# Patient Record
Sex: FEMALE | Race: WHITE | NOT HISPANIC OR LATINO | Employment: OTHER | URBAN - METROPOLITAN AREA
[De-identification: names, ages, dates, MRNs, and addresses within clinical notes are randomized per-mention and may not be internally consistent; named-entity substitution may affect disease eponyms.]

---

## 2021-03-03 ENCOUNTER — TRANSFERRED RECORDS (OUTPATIENT)
Dept: HEALTH INFORMATION MANAGEMENT | Facility: CLINIC | Age: 71
End: 2021-03-03

## 2021-03-11 ENCOUNTER — OFFICE VISIT (OUTPATIENT)
Dept: PLASTIC SURGERY | Facility: CLINIC | Age: 71
End: 2021-03-11
Payer: MEDICARE

## 2021-03-11 DIAGNOSIS — D03.39 LENTIGO MALIGNA (MELANOMA IN SITU) OF CHEEK (H): Primary | ICD-10-CM

## 2021-03-11 NOTE — LETTER
3/11/2021       RE: Kaitlin Molina  [address] [address]     Dear Colleague,    Thank you for referring your patient, Kaitlin Molina, to the [facility]. Please see a copy of my visit note below.    This office note has been dictated.       Again, thank you for allowing me to participate in the care of your patient.      Sincerely,    RANDI ROBERTO MD

## 2021-03-11 NOTE — NURSING NOTE
Educated patient on wound care: cleanse with hydrogen peroxide mixed with water 50/50 on q tips roll over wound bed gently.  Apply Aqauphor/Bacitracin twice daily.  Cover with Telfa dressing.  Instructed patient to get her pre op scheduled for tomorrow 3-12 so that we can be ready to get her scheduled for her surgery early next week.  Pre op teaching completed.    Her son will accompany her to surgery and stay with her for 24 hours.    Anton Melton RN  3/11/2021 2:36 PM

## 2021-03-11 NOTE — LETTER
March 11, 2021  Re: Kaitlin Molina  1950    Dear Dr. Her,    Thank you so much for referring Kaitlin Molina to the [facility]. I had the pleasure of visiting with Kaitlin today.     Attached you will find a copy of my note. Please feel free to reach out to me with any questions, [phone].     Service Date: 03/11/2021      HISTORY OF PRESENT ILLNESS:  Kaitlin is in with her son today.  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  It took more than a week to get clear surgical margins with permanent section.        SOCIAL HISTORY:  She is 70-years-old.  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        ALLERGIES:  She is allergic to ACE inhibitors in that they create a cough.       MEDICATIONS:    1) Synthroid.   2) Potassium replacement.   3) Dyazide for water retention.    4) Verapamil for hypertension.    5) Melatonin to help with sleep.       PAST SURGICAL HISTORY:  She had a rotator cuff repair in 2017.       PHYSICAL EXAMINATION:  She has Jenkins I skin.  She has significant rhytids and sun damage.  She has graying hair with a full head of hair.  There is no head and neck adenopathy. Cranial nerves II-XII are intact.  She has adequate oral opening.  Her lid position is good.  There is a partially closed wound of the left cheek with a defect of approximately 4 x 5 cm.  There is some erythema medially.  She has not changed the bandage since the last session on Monday or Tuesday.  Bandages were changed.       RECOMMENDATIONS/PLAN:   Home cares were discussed and how to care for the wound.  We visibly showed her how to do this.  I am going to want to carry out a reconstruction.  My plan at this point is a cervicofacial rotation flap.  We talked about the risks and benefits, including eyelid malposition, scarring, flap loss etc.  We will look for a  time to do this.  She will need to get in for a pre-operative physical examination. She did well with the anesthetic for the rotation cuff.  She would prefer general anesthetic.        Forty minutes were spent in consultation, the majority with counseling and education as well as her examination and reviewing her pathology report.        Randi Caruso M.D.           2021      Eduardo Her M.D.    [facility]    [address] [place] [address]  [phone]      Dear Dr. Her,      I saw Kaitlin Molina today.  We talked about getting her set up for reconstruction of her cheek.  We will look to do that in the near future.  I will keep you apprised of her outcome.  I appreciate your confidence in sending her to us.       Best Regards,      RANDI CARUSO MD       D: 2021   T: 2021   MT: ms      Name:     KAITLIN MOLINA   MRN:      [id number]        Account:      [id number]   :      1950           Service Date: 2021      Document: [id number]

## 2021-03-12 ENCOUNTER — DOCUMENTATION ONLY (OUTPATIENT)
Dept: OTOLARYNGOLOGY | Facility: CLINIC | Age: 71
End: 2021-03-12

## 2021-03-12 ENCOUNTER — TRANSFERRED RECORDS (OUTPATIENT)
Dept: HEALTH INFORMATION MANAGEMENT | Facility: CLINIC | Age: 71
End: 2021-03-12

## 2021-03-12 NOTE — PROGRESS NOTES
Surgery orders faxed to [facility].    Date: Monday, March 15, 2021  Time: 1pm  Procedure: Left Cervicofacial Rotational Flap for Left Cheek MOHS Defect  Surgeon: Reuben Schroeder    Pt had pre-op physical and COVID testing performed today.  We are unclear on whether or not pt had rapid COVID test vs PCR test performed.  Call placed to pt's primary clinic to determine whether or not COVID test was rapid.      In the event the test was rapid, [facility] will coordinate another COVID PCR test to be performed today.    Sofie Lyons RN  3/12/2021 12:26 PM

## 2021-03-14 NOTE — PROGRESS NOTES
Service Date: 03/11/2021      HISTORY OF PRESENT ILLNESS:  Kaitlin is in with her son today.  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  It took more than a week to get clear surgical margins with permanent section.        SOCIAL HISTORY:  She is 70-years-old.  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        ALLERGIES:  She is allergic to ACE inhibitors in that they create a cough.       MEDICATIONS:    1) Synthroid.   2) Potassium replacement.   3) Dyazide for water retention.    4) Verapamil for hypertension.    5) Melatonin to help with sleep.       PAST SURGICAL HISTORY:  She had a rotator cuff repair in 2017.       PHYSICAL EXAMINATION:  She has Jenkins I skin.  She has significant rhytids and sun damage.  She has graying hair with a full head of hair.  There is no head and neck adenopathy. Cranial nerves II-XII are intact.  She has adequate oral opening.  Her lid position is good.  There is a partially closed wound of the left cheek with a defect of approximately 4 x 5 cm.  There is some erythema medially.  She has not changed the bandage since the last session on Monday or Tuesday.  Bandages were changed.       RECOMMENDATIONS/PLAN:   Home cares were discussed and how to care for the wound.  We visibly showed her how to do this.  I am going to want to carry out a reconstruction.  My plan at this point is a cervicofacial rotation flap.  We talked about the risks and benefits, including eyelid malposition, scarring, flap loss etc.  We will look for a time to do this.  She will need to get in for a pre-operative physical examination. She did well with the anesthetic for the rotation cuff.  She would prefer general anesthetic.        Forty minutes were spent in consultation, the majority with counseling and education as well as her examination and reviewing her  pathology report.        Randi Caruso M.D.                 2021         Eduardo Her M.D.    [facility]    [address] [place] [address]  [phone]         Dear Dr. Her,      I saw Kaitlin Molina today.  We talked about getting her set up for reconstruction of her cheek.  We will look to do that in the near future.  I will keep you apprised of her outcome.  I appreciate your confidence in sending her to us.       Best Regards,         RANDI CARUSO MD             D: 2021   T: 2021   MT: ms      Name:     KAITLIN MOLINA   MRN:      [id number]        Account:      [id number]   :      1950           Service Date: 2021      Document: [id number]

## 2021-03-15 ENCOUNTER — TRANSFERRED RECORDS (OUTPATIENT)
Dept: HEALTH INFORMATION MANAGEMENT | Facility: CLINIC | Age: 71
End: 2021-03-15

## 2021-03-22 ENCOUNTER — OFFICE VISIT (OUTPATIENT)
Dept: PLASTIC SURGERY | Facility: CLINIC | Age: 71
End: 2021-03-22
Payer: MEDICARE

## 2021-03-22 DIAGNOSIS — Z98.890 POSTOPERATIVE STATE: Primary | ICD-10-CM

## 2021-03-22 NOTE — LETTER
3/22/2021       RE: Kaitlin Molina  [address] [address]     Dear Colleague,    Thank you for referring your patient, Kaitlin Molina, to the [facility] at [facility]. Please see a copy of my visit note below.    Service Date: 2021      REASON FOR VISIT: Kaitlin is in today.       PHYSICAL EXAMINATION:  The flap looks wonderfully viable.  All of the permanent sutures are removed.        ASSESSMENT AND PLAN:  I will have her check in with us again in a month or so and talk about resecting or revising the standing cutaneous cone down the road if it is problematic for her.       Randi Caruso M.D.          2021      Eduardo Her M.D.    [facility]    [address] [place] [address]  [phone]      Dear Dr. Her,     I saw Kaitlin Molina back today.  We reconstructed the defect with a large cervicofacial rotation flap.  It is healing very nicely.  Her facial nerve is intact.  There is no evidence of infection.  As always with these big flaps, there may be a standing cutaneous cone that needs to be dealt with down the road.  All in all, we are very happy with her progress and she is pleased as well.        Thanks for letting us help in her care.       Best Regards,      RANDI CARUSO MD       D: 2021   T: 2021   MT: ms      Name:     KAITLIN MOLINA   MRN:      [id number]        Account:      [id number]   :      1950           Service Date: 2021      Document: [id number]

## 2021-03-22 NOTE — LETTER
March 22, 2021  Re: Kaitlin WREN Jesse  1950    Dear Dr. Her,    Thank you so much for referring Kaitlin Molina to the [facility]. I had the pleasure of visiting with Kaitlin today.     Attached you will find a copy of my note. Please feel free to reach out to me with any questions, [phone].     This office note has been dictated.         Your trust in our practice and care is much appreciated.    Sincerely,  RANDI ROBERTO MD

## 2021-03-23 NOTE — PROGRESS NOTES
Service Date: 2021      REASON FOR VISIT: Kaitlin is in today.       PHYSICAL EXAMINATION:  The flap looks wonderfully viable.  All of the permanent sutures are removed.        ASSESSMENT AND PLAN:  I will have her check in with us again in a month or so and talk about resecting or revising the standing cutaneous cone down the road if it is problematic for her.       Randi Caruso M.D.                2021         Eduardo Her M.D.    [facility]    [address] [place] [address]  [phone]         Dear Dr. Her,      I saw Kaitlin Molina back today.  We reconstructed the defect with a large cervicofacial rotation flap.  It is healing very nicely.  Her facial nerve is intact.  There is no evidence of infection.  As always with these big flaps, there may be a standing cutaneous cone that needs to be dealt with down the road.  All in all, we are very happy with her progress and she is pleased as well.        Thanks for letting us help in her care.       Best Regards,         RANDI CARUSO MD             D: 2021   T: 2021   MT: ms      Name:     KAITLIN MOLINA   MRN:      [id number]        Account:      [id number]   :      1950           Service Date: 2021      Document: [id number]

## 2021-04-26 ENCOUNTER — OFFICE VISIT (OUTPATIENT)
Dept: PLASTIC SURGERY | Facility: CLINIC | Age: 71
End: 2021-04-26
Payer: MEDICARE

## 2021-04-26 DIAGNOSIS — Z98.890 POSTOPERATIVE STATE: Primary | ICD-10-CM

## 2021-04-26 NOTE — LETTER
April 26, 2021  Re: Kaitlin WREN Jesse  1950    Dear Dr. Her,    Thank you so much for referring Kaitlin Molina to the [facility]. I had the pleasure of visiting with Kaitlin today.     Attached you will find a copy of my note. Please feel free to reach out to me with any questions, [phone].     This office note has been dictated.         Your trust in our practice and care is much appreciated.    Sincerely,  RANDI ROBERTO MD

## 2021-04-26 NOTE — LETTER
2021       RE: Kaitlin Molina  [address] [address]     Dear Colleague,    Thank you for referring your patient, Kaitlin Molina, to the [facility] at [facility]. Please see a copy of my visit note below.    Service Date: 2021    REASON FOR VISIT: Ms. Molina is back today.      PHYSICAL EXAMINATION: She has some edema and some pin cushioning and a little bit of inferolateral tug on the lid.  She has some occasional epiphora but she does not have a true ectropion.  The flap is neetu and is quite firm.  I trimmed out some Monocryl sutures.     PROCEDURE:  I injected 0.2 of Kenalog in the upper half of the flap in the indurated areas.      ASSESSMENT AND PLAN:  I will have her start massaging it.   She will be back to see me in six weeks.      Osito Caruso MD    D: 2021   T: 2021   MT: ms    Name:     KAITLIN MOLINA  MRN:      [id number]        Account:      [id number]   :      1950           Service Date: 2021       Document: [id number]

## 2021-04-27 NOTE — PROGRESS NOTES
Service Date: 2021    REASON FOR VISIT: Ms. Molina is back today.      PHYSICAL EXAMINATION: She has some edema and some pin cushioning and a little bit of inferolateral tug on the lid.  She has some occasional epiphora but she does not have a true ectropion.  The flap is neetu and is quite firm.  I trimmed out some Monocryl sutures.     PROCEDURE:  I injected 0.2 of Kenalog in the upper half of the flap in the indurated areas.      ASSESSMENT AND PLAN:  I will have her start massaging it.   She will be back to see me in six weeks.      Osito Caruso MD        D: 2021   T: 2021   MT: ms    Name:     JAMAR MOLINA  MRN:      [id number]        Account:      [id number]   :      1950           Service Date: 2021       Document: [id number]

## 2021-06-14 ENCOUNTER — OFFICE VISIT (OUTPATIENT)
Dept: PLASTIC SURGERY | Facility: CLINIC | Age: 71
End: 2021-06-14
Payer: MEDICARE

## 2021-06-14 DIAGNOSIS — Z98.890 POSTOPERATIVE STATE: Primary | ICD-10-CM

## 2021-06-14 NOTE — LETTER
June 14, 2021  Re: Kaitlin WREN Jesse  1950    Dear Dr. Her,    Thank you so much for referring Kaitlin Molina to the [facility]. I had the pleasure of visiting with Kaitlin today.     Attached you will find a copy of my note. Please feel free to reach out to me with any questions, [phone].     This office note has been dictated.         Your trust in our practice and care is much appreciated.    Sincerely,  RANDI ROBERTO MD

## 2021-06-14 NOTE — LETTER
6/14/2021       RE: Kaitlin Molina  [address] [address]     Dear Colleague,    Thank you for referring your patient, Kaitlin Molina, to the [facility] at [facility]. Please see a copy of my visit note below.    This office note has been dictated.       Again, thank you for allowing me to participate in the care of your patient.      Sincerely,    RANDI ROBERTO MD

## 2021-06-15 NOTE — PROGRESS NOTES
Service Date: 2021    REASON FOR VISIT: Kaitlin is in after repair of her cheek defect.      PHYSICAL EXAMINATION: It looks considerably better.  The scar is softening.   There is less downward traction.  The area injected with the steroid is markedly better.     PROCEDURE: I injected another 0.25 of 10 mg/cc Kenalog into the anterior and oblique scar.      RECOMMENDATIONS/PLAN: I will have her see me again in a couple of months.  Overall, I think things are proceeding well.      Osito Caruso MD        D: 06/15/2021   T: 06/15/2021   MT: ms    Name:     KAITLIN CABALLERO  MRN:      [id number]        Account:      [id number]   :      1950           Service Date: 2021       Document: [id number]

## 2021-08-12 ENCOUNTER — OFFICE VISIT (OUTPATIENT)
Dept: PLASTIC SURGERY | Facility: CLINIC | Age: 71
End: 2021-08-12
Payer: MEDICARE

## 2021-08-12 DIAGNOSIS — Z98.890 POSTOPERATIVE STATE: Primary | ICD-10-CM

## 2021-08-12 NOTE — LETTER
8/12/2021       RE: Kaitlin Molina  [address] [address]     Dear Colleague,    Thank you for referring your patient, Kaitlin Molina, to the [facility] at [facility]. Please see a copy of my visit note below.    This office note has been dictated.       Again, thank you for allowing me to participate in the care of your patient.      Sincerely,    RANDI ROBERTO MD

## 2021-08-12 NOTE — LETTER
8/12/2021       RE: Kaitlin Molina  [address] [address]     Dear Colleague,    Thank you for referring your patient, Kaitlin Molina, to the [facility] at [facility]. Please see a copy of my visit note below.    This office note has been dictated.     Service Date: 08/12/2021    REASON FOR VISIT: Kaitlin is back today.      PHYSICAL EXAMINATION:  The hypertrophy is less.  The traction on the lower lids is less.  Her lids are in good position.    PROCEDURE: I injected an additional 0.12 of 10 mg/cc Kenalog into the areas of edema of the flap over the malar area and where the stem and cutaneous cone was resected.  I think the pivot area and standing cutaneous cone is flattening out adequately on its own.  She still has some dysesthesias which is expected.    ASSESSMENT AND PLAN: I will have her back to see me in about three months.      Osito Caruso MD

## 2021-08-12 NOTE — LETTER
August 12, 2021  Re: Kaitlin WREN Jesse  1950    Dear Dr. Her,    Thank you so much for referring Kaitlin Molina to the [facility]. I had the pleasure of visiting with Kaitlin today.     Attached you will find a copy of my note. Please feel free to reach out to me with any questions, [phone].     This office note has been dictated.         Your trust in our practice and care is much appreciated.    Sincerely,  RANDI RBOERTO MD

## 2021-08-15 NOTE — PROGRESS NOTES
Service Date: 2021    REASON FOR VISIT: Kaitlin is back today.      PHYSICAL EXAMINATION:  The hypertrophy is less.  The traction on the lower lids is less.  Her lids are in good position.    PROCEDURE: I injected an additional 0.12 of 10 mg/cc Kenalog into the areas of edema of the flap over the malar area and where the stem and cutaneous cone was resected.  I think the pivot area and standing cutaneous cone is flattening out adequately on its own.  She still has some dysesthesias which is expected.    ASSESSMENT AND PLAN: I will have her back to see me in about three months.      Osito Caruso MD        D: 08/15/2021   T: 08/15/2021   MT: ms    Name:     KAITLIN CABALLERO  MRN:      [id number]        Account:      [id number]   :      1950           Service Date: 2021       Document: [id number]

## 2021-11-11 ENCOUNTER — OFFICE VISIT (OUTPATIENT)
Dept: PLASTIC SURGERY | Facility: CLINIC | Age: 71
End: 2021-11-11
Payer: MEDICARE

## 2021-11-11 DIAGNOSIS — Z98.890 POSTOPERATIVE STATE: Primary | ICD-10-CM

## 2021-11-11 NOTE — LETTER
2021      RE: Kaitlin Molina  [address] [address]     Service Date: 2021    REASON FOR VISIT: Kaitlin is in today.      PHYSICAL EXAMINATION: The scar is still somewhat hypertrophic but her lid is in much, much better position and it causes very few symptoms for her.  The scar is still somewhat indurated.      PROCEDURE: I injected an additional 0.25 cc of 10 mg/cc Kenalog.      ASSESSMENT AND PLAN: I will see her in about three months.      Osito Caruso MD        D: 2021   T: 2021   MT: ms    Name:     KAITLIN MOLINA  MRN:      [id number]        Account:      [id number]   :      1950           Service Date: 2021       Document: [id number]

## 2021-11-11 NOTE — LETTER
November 11, 2021  Re: Kaitlin WREN Jesse  1950    Dear Dr. Her,    Thank you so much for referring Kaitlin Molina to the [facility]. I had the pleasure of visiting with Kaitlin today.     Attached you will find a copy of my note. Please feel free to reach out to me with any questions, [phone].     This office note has been dictated.         Your trust in our practice and care is much appreciated.    Sincerely,  RANDI ROBERTO MD

## 2021-11-11 NOTE — LETTER
2021      RE: Kaitlin Molina  [address] [address]       Service Date: 2021    REASON FOR VISIT: Kaitlin is in today.      PHYSICAL EXAMINATION: The scar is still somewhat hypertrophic but her lid is in much, much better position and it causes very few symptoms for her.  The scar is still somewhat indurated.      PROCEDURE: I injected an additional 0.25 cc of 10 mg/cc Kenalog.      ASSESSMENT AND PLAN: I will see her in about three months.      Osito Caruso MD        D: 2021   T: 2021   MT: ms    Name:     KAITLIN MOLINA  MRN:      [id number]        Account:      [id number]   :      1950           Service Date: 2021       Document: [id number]

## 2021-11-11 NOTE — LETTER
11/11/2021       RE: Kaitlin Molina  [address] [address]     Dear Colleague,    Thank you for referring your patient, Kaitlin Molina, to the [facility] at [facility]. Please see a copy of my visit note below.    This office note has been dictated.       Again, thank you for allowing me to participate in the care of your patient.      Sincerely,    RANDI ROBERTO MD

## 2021-11-13 NOTE — PROGRESS NOTES
Service Date: 2021    REASON FOR VISIT: Kaitlin is in today.      PHYSICAL EXAMINATION: The scar is still somewhat hypertrophic but her lid is in much, much better position and it causes very few symptoms for her.  The scar is still somewhat indurated.      PROCEDURE: I injected an additional 0.25 cc of 10 mg/cc Kenalog.      ASSESSMENT AND PLAN: I will see her in about three months.      Osito Caruso MD        D: 2021   T: 2021   MT: ms    Name:     KAITLIN CABALLERO  MRN:      [id number]        Account:      [id number]   :      1950           Service Date: 2021       Document: [id number]

## 2022-02-10 ENCOUNTER — OFFICE VISIT (OUTPATIENT)
Dept: PLASTIC SURGERY | Facility: CLINIC | Age: 72
End: 2022-02-10
Payer: MEDICARE

## 2022-02-10 DIAGNOSIS — Z98.890 POSTOPERATIVE STATE: Primary | ICD-10-CM

## 2022-02-10 NOTE — LETTER
2/10/2022      RE: Kaitlin Molina  [address] [facility] [address]           Service Date: 02/10/2022    HISTORY OF PRESENT ILLNESS: Kaitlin is in today.      PHYSICAL EXAMINATION: She really likes the contour of the lower lid on the left.  There is still a bit of a standing cutaneous cone but that does not bother her.      ASSESSMENT AND PLAN: We will take photos today.  I anticipate the cheek will soften more with time.  She will continue to see Dr. Carroll at [facility] for ongoing management and surveillance of her skin.      Osito Caruso M.D.            February 10, 2022      Eduardo Her M.D.   [facility]  [address] [address]    [facility]oy Carroll [facility]. [facility]  [facility]  [address] [id number]      Dear Doctors,    I had the pleasure of seeing Kaitlin back after her surgery today.  We used a cervicofacial rotation flap.  Her lid has maintained good position.  The incision lines have settled in nicely.  She does have a bit of a standing cutaneous cone lower in the cheek which I had left in order to preserve a broad enough vascular pedicle to the flap.  I talked with her about revising that, but it is not problematic enough that she wants to do anything.  I think that is a reasonable approach.  She will follow up with us as needed.      Best Regards,       Osito Caruso MD        D: 2022   T: 2022   MT: ms    Name:     KAITLIN MOLINA  MRN:      [id number]        Account:      [id number]   :      1950           Service Date: 02/10/2022       Document: [id number]

## 2022-02-10 NOTE — LETTER
2/10/2022       RE: Kaitlin Molina  [address] [address]     Dear Colleague,    Thank you for referring your patient, Kaitlin Molina, to the [facility] at [facility]. Please see a copy of my visit note below.    This office note has been dictated.       Again, thank you for allowing me to participate in the care of your patient.      Sincerely,    RANDI ROBERTO MD

## 2022-02-10 NOTE — LETTER
February 10, 2022  Re: Kaitlin WREN Jesse  1950    Dear Dr. Her,    Thank you so much for referring Kaitlin Molina to the [facility]. I had the pleasure of visiting with Kaitlin today.     Attached you will find a copy of my note. Please feel free to reach out to me with any questions, [phone].     This office note has been dictated.         Your trust in our practice and care is much appreciated.    Sincerely,  RANDI ROBERTO MD

## 2022-02-10 NOTE — LETTER
2/10/2022      RE: Kaitlin Molina  [address] [facility] [address]         Service Date: 02/10/2022    HISTORY OF PRESENT ILLNESS: Kaitlin is in today.      PHYSICAL EXAMINATION: She really likes the contour of the lower lid on the left.  There is still a bit of a standing cutaneous cone but that does not bother her.      ASSESSMENT AND PLAN: We will take photos today.  I anticipate the cheek will soften more with time.  She will continue to see Dr. Carroll at [facility] for ongoing management and surveillance of her skin.      Osito Caruso M.D.            February 10, 2022      Eduardo Her M.D.   [facility]  [address] [address]    [facility]  [facility]  [address] [id number]      Dear Doctors,    I had the pleasure of seeing Kaitlin back after her surgery today.  We used a cervicofacial rotation flap.  Her lid has maintained good position.  The incision lines have settled in nicely.  She does have a bit of a standing cutaneous cone lower in the cheek which I had left in order to preserve a broad enough vascular pedicle to the flap.  I talked with her about revising that, but it is not problematic enough that she wants to do anything.  I think that is a reasonable approach.  She will follow up with us as needed.      Best Regards,       Osito Caruso MD        D: 2022   T: 2022   MT: ms    Name:     KAITLIN MOLINA  MRN:      [id number]        Account:      [id number]   :      1950           Service Date: 02/10/2022       Document: [id number]

## 2022-02-12 NOTE — PROGRESS NOTES
Service Date: 02/10/2022    HISTORY OF PRESENT ILLNESS: Kaitlin is in today.      PHYSICAL EXAMINATION: She really likes the contour of the lower lid on the left.  There is still a bit of a standing cutaneous cone but that does not bother her.      ASSESSMENT AND PLAN: We will take photos today.  I anticipate the cheek will soften more with time.  She will continue to see Dr. Carroll at [facility] for ongoing management and surveillance of her skin.      Osito Caruso M.D.            February 10, 2022      Eduardo Her M.D.   [facility]  [address] [address]    [facility]. [facility]  [facility]  [address] [address]      Dear Doctors,    I had the pleasure of seeing Kaitlin back after her surgery today.  We used a cervicofacial rotation flap.  Her lid has maintained good position.  The incision lines have settled in nicely.  She does have a bit of a standing cutaneous cone lower in the cheek which I had left in order to preserve a broad enough vascular pedicle to the flap.  I talked with her about revising that, but it is not problematic enough that she wants to do anything.  I think that is a reasonable approach.  She will follow up with us as needed.      Best Regards,       Osito Caruso MD        D: 2022   T: 2022   MT: ms    Name:     KAITLIN CABALLERO  MRN:      [id number]        Account:      [id number]   :      1950           Service Date: 02/10/2022       Document: [id number]